# Patient Record
Sex: MALE | Race: AMERICAN INDIAN OR ALASKA NATIVE
[De-identification: names, ages, dates, MRNs, and addresses within clinical notes are randomized per-mention and may not be internally consistent; named-entity substitution may affect disease eponyms.]

---

## 2018-01-22 ENCOUNTER — HOSPITAL ENCOUNTER (OUTPATIENT)
Dept: HOSPITAL 5 - SPVIMAG | Age: 33
Discharge: HOME | End: 2018-01-22
Attending: ORTHOPAEDIC SURGERY
Payer: COMMERCIAL

## 2018-01-22 DIAGNOSIS — M17.11: Primary | ICD-10-CM

## 2018-01-23 NOTE — XRAY REPORT
FINAL REPORT



EXAM:  XR KNEE 3V RT



HISTORY:  RIGHT KNEE PAIN 



COMPARISONS:  None.



FINDINGS:  

Four views right knee 



Mild tricompartmental osteoarthrosis. No fracture, gross

malalignment or deformity. There is obliquity on lateral view,

and no definite joint effusion is identified. 



IMPRESSION:  

No acute right knee finding. Mild tricompartmental

osteoarthrosis.

## 2018-02-07 ENCOUNTER — HOSPITAL ENCOUNTER (OUTPATIENT)
Dept: HOSPITAL 5 - SPVIMAG | Age: 33
Discharge: HOME | End: 2018-02-07
Attending: ORTHOPAEDIC SURGERY
Payer: COMMERCIAL

## 2018-02-07 DIAGNOSIS — M19.022: Primary | ICD-10-CM

## 2018-02-07 DIAGNOSIS — M77.8: ICD-10-CM

## 2018-02-07 NOTE — XRAY REPORT
LEFT ELBOW THREE VIEWS: 02/07/18 09:01:00



CLINICAL: Left elbow pain.



FINDINGS: Moderate ulnohumeral joint osteoarthritis with narrowing of 

the joint space and osteophytes.  A small triceps insertion 

enthesophyte.  Less severe osteoarthritis of the radiohumeral joint.  

No fracture or dislocation.  No joint effusion.  Normal soft tissues.



IMPRESSION: Osteoarthritis and triceps enthesopathy.